# Patient Record
Sex: FEMALE | Race: BLACK OR AFRICAN AMERICAN | NOT HISPANIC OR LATINO | URBAN - METROPOLITAN AREA
[De-identification: names, ages, dates, MRNs, and addresses within clinical notes are randomized per-mention and may not be internally consistent; named-entity substitution may affect disease eponyms.]

---

## 2022-04-12 ENCOUNTER — OFFICE VISIT (OUTPATIENT)
Dept: URBAN - METROPOLITAN AREA CLINIC 44 | Facility: CLINIC | Age: 32
End: 2022-04-12

## 2022-05-13 ENCOUNTER — OFFICE VISIT (OUTPATIENT)
Dept: URBAN - METROPOLITAN AREA CLINIC 84 | Facility: CLINIC | Age: 32
End: 2022-05-13
Payer: OTHER GOVERNMENT

## 2022-05-13 DIAGNOSIS — Z33.1 PREGNANT STATE, INCIDENTAL: ICD-10-CM

## 2022-05-13 DIAGNOSIS — K21.9 GERD: ICD-10-CM

## 2022-05-13 PROCEDURE — 99204 OFFICE O/P NEW MOD 45 MIN: CPT | Performed by: INTERNAL MEDICINE

## 2022-05-13 RX ORDER — FAMOTIDINE 40 MG/1
1 TABLET TABLET, FILM COATED ORAL TWICE A DAY
Qty: 60 | Refills: 5 | OUTPATIENT
Start: 2022-05-13

## 2022-05-13 NOTE — HPI-TODAY'S VISIT:
The patient was referred by Dr. Rakan Roberts for GERD .   A copy of this document is being forwarded to the referring provider.  She is 6 months pregnant.  She has heartburn and GERD.  She has supine symptoms.  She has "mucus" in the back of her throat.  SHe has some sinus drainage.  She had symptoms prior to her pregnancy.  She denies N/V.  She ahs occaisonal difficulty swallowing.  She delroy LGI symptoms.  SHe denies LGI Bleed or melena.  She tried Maalox with some relief.  She was on Omperazole 40 mg prior to her pregnancy

## 2022-06-24 ENCOUNTER — OFFICE VISIT (OUTPATIENT)
Dept: URBAN - METROPOLITAN AREA CLINIC 84 | Facility: CLINIC | Age: 32
End: 2022-06-24

## 2022-10-31 ENCOUNTER — CLAIMS CREATED FROM THE CLAIM WINDOW (OUTPATIENT)
Dept: URBAN - METROPOLITAN AREA CLINIC 48 | Facility: CLINIC | Age: 32
End: 2022-10-31
Payer: OTHER GOVERNMENT

## 2022-10-31 ENCOUNTER — LAB OUTSIDE AN ENCOUNTER (OUTPATIENT)
Dept: URBAN - METROPOLITAN AREA CLINIC 48 | Facility: CLINIC | Age: 32
End: 2022-10-31

## 2022-10-31 VITALS
TEMPERATURE: 97.3 F | WEIGHT: 146 LBS | HEART RATE: 90 BPM | HEIGHT: 63 IN | SYSTOLIC BLOOD PRESSURE: 135 MMHG | DIASTOLIC BLOOD PRESSURE: 95 MMHG | BODY MASS INDEX: 25.87 KG/M2

## 2022-10-31 DIAGNOSIS — K62.5 RECTAL BLEEDING: ICD-10-CM

## 2022-10-31 DIAGNOSIS — K21.9 GERD: ICD-10-CM

## 2022-10-31 PROCEDURE — 99204 OFFICE O/P NEW MOD 45 MIN: CPT | Performed by: INTERNAL MEDICINE

## 2022-10-31 RX ORDER — ACETAMINOPHEN 325 MG
1 TABLET AS NEEDED TABLET ORAL
Status: ACTIVE | COMMUNITY

## 2022-10-31 NOTE — HPI-TODAY'S VISIT:
31 yo F presents for an OV for rectal bleeding. Mentions severe hematochezia with wiping. Mentions bright red bleeding with wiping x one night. Associates lower abdominal pain which prompted an ER visit last night. CBC reviewed and was unremarkable. However, CMP revealed a low K of 3.0. Recently, she had an URTI and was given Augmentin with subsequent diarrhea x  10 days.  She is 2 months post-partum. Denies complications with the pregnancy.  The patient associates GERD with significant belching over the last few days. Previously she saw Dr. Fagan who prescribed Famotidine. She stopped taking this after delivery as her symptoms resolved. She also does not think the Famotidine helped. She also mentions nausea without vomiting. Associates lower abdominal cramping.

## 2022-11-01 PROBLEM — 235595009 GASTROESOPHAGEAL REFLUX DISEASE: Status: ACTIVE | Noted: 2022-05-13

## 2022-11-09 ENCOUNTER — OFFICE VISIT (OUTPATIENT)
Dept: URBAN - METROPOLITAN AREA SURGERY CENTER 28 | Facility: SURGERY CENTER | Age: 32
End: 2022-11-09
Payer: OTHER GOVERNMENT

## 2022-11-09 ENCOUNTER — TELEPHONE ENCOUNTER (OUTPATIENT)
Dept: URBAN - METROPOLITAN AREA CLINIC 44 | Facility: CLINIC | Age: 32
End: 2022-11-09

## 2022-11-09 DIAGNOSIS — K29.60 ADENOPAPILLOMATOSIS GASTRICA: ICD-10-CM

## 2022-11-09 DIAGNOSIS — K30 ACID INDIGESTION: ICD-10-CM

## 2022-11-09 DIAGNOSIS — K31.7 BENIGN GASTRIC POLYP: ICD-10-CM

## 2022-11-09 DIAGNOSIS — K92.1 ACUTE MELENA: ICD-10-CM

## 2022-11-09 PROCEDURE — 43239 EGD BIOPSY SINGLE/MULTIPLE: CPT | Performed by: INTERNAL MEDICINE

## 2022-11-09 PROCEDURE — 45378 DIAGNOSTIC COLONOSCOPY: CPT | Performed by: INTERNAL MEDICINE

## 2022-11-09 PROCEDURE — G8907 PT DOC NO EVENTS ON DISCHARG: HCPCS | Performed by: INTERNAL MEDICINE

## 2022-11-09 RX ORDER — DEXLANSOPRAZOLE 60 MG/1
1 CAPSULE CAPSULE, DELAYED RELEASE ORAL ONCE A DAY
Qty: 60 CAPSULE | Refills: 0 | OUTPATIENT
Start: 2022-11-09

## 2022-11-09 RX ORDER — ACETAMINOPHEN 325 MG
1 TABLET AS NEEDED TABLET ORAL
Status: ACTIVE | COMMUNITY

## 2022-11-18 ENCOUNTER — TELEPHONE ENCOUNTER (OUTPATIENT)
Dept: URBAN - METROPOLITAN AREA CLINIC 44 | Facility: CLINIC | Age: 32
End: 2022-11-18

## 2022-11-18 RX ORDER — DEXLANSOPRAZOLE 60 MG/1
1 CAPSULE CAPSULE, DELAYED RELEASE ORAL ONCE A DAY
Qty: 60 CAPSULE | Refills: 0 | Status: ACTIVE | COMMUNITY
Start: 2022-11-09

## 2022-11-18 RX ORDER — PANTOPRAZOLE SODIUM 40 MG/1
1 TABLET TABLET, DELAYED RELEASE ORAL ONCE A DAY
Qty: 60 | Refills: 2 | OUTPATIENT
Start: 2022-11-21

## 2022-11-18 RX ORDER — ACETAMINOPHEN 325 MG
1 TABLET AS NEEDED TABLET ORAL
Status: ACTIVE | COMMUNITY

## 2023-09-25 ENCOUNTER — DASHBOARD ENCOUNTERS (OUTPATIENT)
Age: 33
End: 2023-09-25

## 2023-09-26 ENCOUNTER — OFFICE VISIT (OUTPATIENT)
Dept: URBAN - METROPOLITAN AREA CLINIC 44 | Facility: CLINIC | Age: 33
End: 2023-09-26
Payer: OTHER GOVERNMENT

## 2023-09-26 VITALS
HEIGHT: 63 IN | DIASTOLIC BLOOD PRESSURE: 95 MMHG | SYSTOLIC BLOOD PRESSURE: 137 MMHG | TEMPERATURE: 97.7 F | WEIGHT: 156 LBS | BODY MASS INDEX: 27.64 KG/M2 | HEART RATE: 71 BPM

## 2023-09-26 DIAGNOSIS — K21.9 GERD: ICD-10-CM

## 2023-09-26 PROCEDURE — 99213 OFFICE O/P EST LOW 20 MIN: CPT | Performed by: INTERNAL MEDICINE

## 2023-09-26 RX ORDER — GUAIFENESIN 600 MG/1
1 TABLET AS NEEDED TABLET, EXTENDED RELEASE ORAL
Qty: 60 TABLET | Refills: 0 | OUTPATIENT
Start: 2023-09-26

## 2023-09-26 RX ORDER — FERROUS SULFATE 325(65) MG
1 TABLET TABLET ORAL
Status: ACTIVE | COMMUNITY

## 2023-09-26 RX ORDER — PANTOPRAZOLE SODIUM 40 MG/1
1 TABLET TABLET, DELAYED RELEASE ORAL ONCE A DAY
Qty: 90 TABLET | Refills: 1 | OUTPATIENT
Start: 2023-09-26

## 2023-09-26 RX ORDER — IBUPROFEN 800 MG/1
1 TABLET WITH FOOD OR MILK AS NEEDED TABLET, FILM COATED ORAL
Status: ACTIVE | COMMUNITY

## 2023-09-26 RX ORDER — SERTRALINE 25 MG/1
1 TABLET TABLET, FILM COATED ORAL ONCE A DAY
Status: ACTIVE | COMMUNITY

## 2023-09-26 RX ORDER — FLUTICASONE PROPIONATE 50 UG/1
1 SPRAY IN EACH NOSTRIL SPRAY, METERED NASAL ONCE A DAY
Status: ACTIVE | COMMUNITY

## 2023-09-26 NOTE — HPI-TODAY'S VISIT:
32 yo F  patient presents for evaluation of GERD. She  associates GERD with significant belching that has been chronic.  Previously she saw Dr. Fagan who prescribed Famotidine  40 mg x PO x daily. She stopped taking this after the birth of her child as her symptoms seemed to have resolved. In addition, she did not think the Famotidine helped.  Currently, she mentions nausea without vomiting. Associates lower abdominal cramping. We initiated Dexlansoprazole but the insurance company required another PPI trial. Pantoprazole 40 mg was initiated, and she mentions that it worked fine until August of this last year. She mentions persistent GERD with associated "air pockets" in her throat and chest. She also mentions associated neck and shoulder pain. Her last EGD on 11/22 was normal. She has been off medication since March of this year.

## 2023-12-20 ENCOUNTER — OFFICE VISIT (OUTPATIENT)
Dept: URBAN - METROPOLITAN AREA CLINIC 48 | Facility: CLINIC | Age: 33
End: 2023-12-20

## 2024-05-16 ENCOUNTER — TELEPHONE ENCOUNTER (OUTPATIENT)
Dept: URBAN - METROPOLITAN AREA CLINIC 46 | Facility: CLINIC | Age: 34
End: 2024-05-16

## 2024-05-16 RX ORDER — PANTOPRAZOLE SODIUM 40 MG/1
1 TABLET TABLET, DELAYED RELEASE ORAL ONCE A DAY
Qty: 90 | Refills: 1
Start: 2023-09-26